# Patient Record
Sex: MALE | Race: WHITE | NOT HISPANIC OR LATINO | Employment: FULL TIME | URBAN - METROPOLITAN AREA
[De-identification: names, ages, dates, MRNs, and addresses within clinical notes are randomized per-mention and may not be internally consistent; named-entity substitution may affect disease eponyms.]

---

## 2021-06-02 ENCOUNTER — RECORDS - HEALTHEAST (OUTPATIENT)
Dept: ADMINISTRATIVE | Facility: CLINIC | Age: 26
End: 2021-06-02

## 2023-09-11 ENCOUNTER — OFFICE VISIT (OUTPATIENT)
Dept: FAMILY MEDICINE | Facility: CLINIC | Age: 28
End: 2023-09-11
Payer: COMMERCIAL

## 2023-09-11 VITALS
SYSTOLIC BLOOD PRESSURE: 123 MMHG | BODY MASS INDEX: 32.01 KG/M2 | TEMPERATURE: 97.7 F | HEART RATE: 64 BPM | OXYGEN SATURATION: 98 % | RESPIRATION RATE: 20 BRPM | WEIGHT: 211.2 LBS | DIASTOLIC BLOOD PRESSURE: 86 MMHG | HEIGHT: 68 IN

## 2023-09-11 DIAGNOSIS — F42.2 MIXED OBSESSIONAL THOUGHTS AND ACTS: Primary | ICD-10-CM

## 2023-09-11 DIAGNOSIS — F41.9 ANXIETY: ICD-10-CM

## 2023-09-11 DIAGNOSIS — F41.8 DEPRESSION WITH ANXIETY: ICD-10-CM

## 2023-09-11 PROBLEM — J34.3 HYPERTROPHY OF NASAL TURBINATES: Status: ACTIVE | Noted: 2021-07-23

## 2023-09-11 PROCEDURE — 99204 OFFICE O/P NEW MOD 45 MIN: CPT | Performed by: NURSE PRACTITIONER

## 2023-09-11 PROCEDURE — 96127 BRIEF EMOTIONAL/BEHAV ASSMT: CPT | Performed by: NURSE PRACTITIONER

## 2023-09-11 RX ORDER — FLUOXETINE 10 MG/1
10 CAPSULE ORAL DAILY
Qty: 90 CAPSULE | Refills: 0 | Status: SHIPPED | OUTPATIENT
Start: 2023-09-11 | End: 2023-12-07

## 2023-09-11 ASSESSMENT — ANXIETY QUESTIONNAIRES
5. BEING SO RESTLESS THAT IT IS HARD TO SIT STILL: MORE THAN HALF THE DAYS
6. BECOMING EASILY ANNOYED OR IRRITABLE: MORE THAN HALF THE DAYS
GAD7 TOTAL SCORE: 15
IF YOU CHECKED OFF ANY PROBLEMS ON THIS QUESTIONNAIRE, HOW DIFFICULT HAVE THESE PROBLEMS MADE IT FOR YOU TO DO YOUR WORK, TAKE CARE OF THINGS AT HOME, OR GET ALONG WITH OTHER PEOPLE: SOMEWHAT DIFFICULT
3. WORRYING TOO MUCH ABOUT DIFFERENT THINGS: NEARLY EVERY DAY
1. FEELING NERVOUS, ANXIOUS, OR ON EDGE: NEARLY EVERY DAY
2. NOT BEING ABLE TO STOP OR CONTROL WORRYING: NEARLY EVERY DAY
GAD7 TOTAL SCORE: 15
7. FEELING AFRAID AS IF SOMETHING AWFUL MIGHT HAPPEN: NOT AT ALL

## 2023-09-11 ASSESSMENT — ENCOUNTER SYMPTOMS: NERVOUS/ANXIOUS: 1

## 2023-09-11 ASSESSMENT — PATIENT HEALTH QUESTIONNAIRE - PHQ9
SUM OF ALL RESPONSES TO PHQ QUESTIONS 1-9: 4
5. POOR APPETITE OR OVEREATING: MORE THAN HALF THE DAYS

## 2023-09-11 ASSESSMENT — PAIN SCALES - GENERAL: PAINLEVEL: NO PAIN (0)

## 2023-09-11 NOTE — PATIENT INSTRUCTIONS
Stop taking the celexa.     Start taking Prozac 1 tablet daily. If after 2 weeks of taking it, you feel the dose needs to increase, you can take 2 tablets once a day.     Follow-up in 1-3 months if symptoms are not improving and you need a dose adjustment. You can schedule your annual physical at the same time.     Counseling referral placed if you would like to pursue that. They will call you to schedule that appointment.   
PHYSICAL EXAM/HIV/HISTORY OF PRESENT ILLNESS/DISPOSITION/VITAL SIGNS( Pullset)/PAST MEDICAL/SURGICAL/SOCIAL HISTORY/REVIEW OF SYSTEMS

## 2023-09-11 NOTE — PROGRESS NOTES
"  Assessment & Plan     Mixed obsessional thoughts and acts  Chronic. Wanting to restart medication. Given OCD tendencies, discussed medication options. Prozac started with instructions to increase to 2 tablets if he needs it after 2 weeks. Side effects, risks and benefits of medication were discussed with patient. Discussed how and when to take medication. He is going to stop taking Celexa and start Prozac tomorrow. Counseling referral placed. He will consider it. Follow-up in 1-3 months for medication review and for annual physical depending on how the medication works for him.     - Adult Mental Health  Referral; Future  - FLUoxetine (PROZAC) 10 MG capsule; Take 1 capsule (10 mg) by mouth daily  - PRIMARY CARE FOLLOW-UP SCHEDULING; Future    Anxiety  Chronic. See above.     - Adult Mental Health  Referral; Future  - FLUoxetine (PROZAC) 10 MG capsule; Take 1 capsule (10 mg) by mouth daily  - PRIMARY CARE FOLLOW-UP SCHEDULING; Future    Depression with anxiety  Chronic. See above.     - FLUoxetine (PROZAC) 10 MG capsule; Take 1 capsule (10 mg) by mouth daily  - PRIMARY CARE FOLLOW-UP SCHEDULING; Future    Prescription drug management         Nicotine/Tobacco Cessation:  He reports that he has been smoking cigarettes. He has never been exposed to tobacco smoke. His smokeless tobacco use includes chew.  Nicotine/Tobacco Cessation Plan:         BMI:   Estimated body mass index is 32.3 kg/m  as calculated from the following:    Height as of this encounter: 1.722 m (5' 7.8\").    Weight as of this encounter: 95.8 kg (211 lb 3.2 oz).       Patient Instructions   Stop taking the celexa.     Start taking Prozac 1 tablet daily. If after 2 weeks of taking it, you feel the dose needs to increase, you can take 2 tablets once a day.     Follow-up in 1-3 months if symptoms are not improving and you need a dose adjustment. You can schedule your annual physical at the same time.     Counseling referral placed if " you would like to pursue that. They will call you to schedule that appointment.     Vanessa Fung DNP  [facility] NANCY Madrigal is a 28 year old, presenting for the following health issues:  Medication Request (Refill request citalopram 20mg, has only been back on medication the past two weeks, had been off for nine months, has DX OCD and anxiety )        9/11/2023     4:29 PM   Additional Questions   Roomed by Melinda   Accompanied by none         9/11/2023     4:29 PM   Patient Reported Additional Medications   Patient reports taking the following new medications Citalopram 20mg       History of Present Illness       Reason for visit:  Get meds    He eats 0-1 servings of fruits and vegetables daily.He consumes 2 sweetened beverage(s) daily.He exercises with enough effort to increase his heart rate 30 to 60 minutes per day.  He exercises with enough effort to increase his heart rate 6 days per week. He is missing 7 dose(s) of medications per week.       Anxiety Follow-Up  How are you doing with your anxiety since your last visit? Worsened has only been back on meds the past 2 weeks or so  Are you having other symptoms that might be associated with anxiety? No  Have you had a significant life event? OTHER: getting  soon     Are you feeling depressed? Unsure, is feeling more anxiety  Do you have any concerns with your use of alcohol or other drugs? No    Social History     Tobacco Use    Smoking status: Some Days     Years: 4.00     Types: Cigarettes     Passive exposure: Never    Smokeless tobacco: Current     Types: Chew    Tobacco comments:     One cigarette once a week or so, chewing tobacco daily the past 3 years    Vaping Use    Vaping Use: Never used         9/11/2023     4:39 PM   SEBASTIEN-7 SCORE   Total Score 15         9/11/2023     4:39 PM   PHQ   PHQ-9 Total Score 4   Q9: Thoughts of better off dead/self-harm past 2 weeks Not at all         9/11/2023     4:39 PM   Last  "PHQ-9   1.  Little interest or pleasure in doing things 1   2.  Feeling down, depressed, or hopeless 1   3.  Trouble falling or staying asleep, or sleeping too much 1   4.  Feeling tired or having little energy 1   5.  Poor appetite or overeating 0   6.  Feeling bad about yourself 0   7.  Trouble concentrating 0   8.  Moving slowly or restless 0   Q9: Thoughts of better off dead/self-harm past 2 weeks 0   PHQ-9 Total Score 4   Difficulty at work, home, or with people Somewhat difficult         9/11/2023     4:39 PM   SEBASTIEN-7    1. Feeling nervous, anxious, or on edge 3   2. Not being able to stop or control worrying 3   3. Worrying too much about different things 3   4. Trouble relaxing 2   5. Being so restless that it is hard to sit still 2   6. Becoming easily annoyed or irritable 2   7. Feeling afraid, as if something awful might happen 0   SEBASTIEN-7 Total Score 15   If you checked any problems, how difficult have they made it for you to do your work, take care of things at home, or get along with other people? Somewhat difficult     Additional provider notes: Patient presents in clinic for refill of celexa. He had been off of it for almost a year. He doesn't like being on medications in case the \"world shuts down\". His OCD and anxiety \"kicked up\" recently took some of his mom's Celexa. He started it 2 weeks ago and would like to get on something. Has intrusive thoughts. Recently read a book about it and that helped.     Lexapro had more libido s/e than celexa.     Allergies   Allergen Reactions    Amoxicillin Hives       Current Outpatient Medications   Medication    FLUoxetine (PROZAC) 10 MG capsule     No current facility-administered medications for this visit.       No past medical history on file.         Review of Systems   Psychiatric/Behavioral:  Positive for mood changes. The patient is nervous/anxious.    All other systems reviewed and are negative.           Objective    /86 (BP Location: Right arm, " "Patient Position: Sitting, Cuff Size: Adult Large)   Pulse 64   Temp 97.7  F (36.5  C) (Tympanic)   Resp 20   Ht 1.722 m (5' 7.8\")   Wt 95.8 kg (211 lb 3.2 oz)   SpO2 98%   BMI 32.30 kg/m    Body mass index is 32.3 kg/m .  Physical Exam  Vitals reviewed.   Constitutional:       General: He is not in acute distress.     Appearance: Normal appearance. He is not ill-appearing or toxic-appearing.   Cardiovascular:      Rate and Rhythm: Normal rate and regular rhythm.      Pulses: Normal pulses.      Heart sounds: Normal heart sounds.   Pulmonary:      Effort: Pulmonary effort is normal.      Breath sounds: Normal breath sounds.   Musculoskeletal:         General: Normal range of motion.   Skin:     General: Skin is warm and dry.   Neurological:      Mental Status: He is alert and oriented to person, place, and time.   Psychiatric:         Behavior: Behavior normal.                              "

## 2023-10-14 ENCOUNTER — HEALTH MAINTENANCE LETTER (OUTPATIENT)
Age: 28
End: 2023-10-14

## 2023-12-07 DIAGNOSIS — F41.9 ANXIETY: ICD-10-CM

## 2023-12-07 DIAGNOSIS — F42.2 MIXED OBSESSIONAL THOUGHTS AND ACTS: ICD-10-CM

## 2023-12-07 DIAGNOSIS — F41.8 DEPRESSION WITH ANXIETY: ICD-10-CM

## 2023-12-07 RX ORDER — FLUOXETINE 10 MG/1
10 CAPSULE ORAL DAILY
Qty: 90 CAPSULE | Refills: 0 | OUTPATIENT
Start: 2023-12-07

## 2023-12-07 NOTE — TELEPHONE ENCOUNTER
Overdue for needed care. Please call to schedule appointment for annual exam and med check. Once appt is scheduled, route back to the pool.     Julie Behrendt RN

## 2023-12-08 RX ORDER — FLUOXETINE 10 MG/1
10 CAPSULE ORAL DAILY
Qty: 90 CAPSULE | Refills: 0 | Status: SHIPPED | OUTPATIENT
Start: 2023-12-08

## 2024-12-07 ENCOUNTER — HEALTH MAINTENANCE LETTER (OUTPATIENT)
Age: 29
End: 2024-12-07